# Patient Record
Sex: FEMALE | Race: WHITE | NOT HISPANIC OR LATINO | Employment: OTHER | URBAN - METROPOLITAN AREA
[De-identification: names, ages, dates, MRNs, and addresses within clinical notes are randomized per-mention and may not be internally consistent; named-entity substitution may affect disease eponyms.]

---

## 2017-03-09 ENCOUNTER — OFFICE VISIT (OUTPATIENT)
Dept: INTERNAL MEDICINE | Facility: CLINIC | Age: 32
End: 2017-03-09
Payer: COMMERCIAL

## 2017-03-09 VITALS
BODY MASS INDEX: 25.39 KG/M2 | TEMPERATURE: 99 F | HEIGHT: 63 IN | WEIGHT: 143.31 LBS | SYSTOLIC BLOOD PRESSURE: 130 MMHG | HEART RATE: 72 BPM | DIASTOLIC BLOOD PRESSURE: 68 MMHG

## 2017-03-09 DIAGNOSIS — B35.1 ONYCHOMYCOSIS: Primary | ICD-10-CM

## 2017-03-09 DIAGNOSIS — R21 RASH: ICD-10-CM

## 2017-03-09 DIAGNOSIS — R03.0 PREHYPERTENSION: ICD-10-CM

## 2017-03-09 PROCEDURE — 99213 OFFICE O/P EST LOW 20 MIN: CPT | Mod: S$GLB,,, | Performed by: INTERNAL MEDICINE

## 2017-03-09 PROCEDURE — 1160F RVW MEDS BY RX/DR IN RCRD: CPT | Mod: S$GLB,,, | Performed by: INTERNAL MEDICINE

## 2017-03-09 PROCEDURE — 99999 PR PBB SHADOW E&M-EST. PATIENT-LVL III: CPT | Mod: PBBFAC,,, | Performed by: INTERNAL MEDICINE

## 2017-03-09 NOTE — PROGRESS NOTES
Subjective:       Patient ID: Megan Aldana is a 32 y.o. female.    Chief Complaint: Nail Problem    HPI     Patient is a 32 year old here today to discuss several issues.  1. Toe nail fungus:  Has not tried anything topical this time  Did try oral medication in the past and it helped  2. Red rash on the back of her legs.  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 Elevated BP reading today  No excess salt intake  No family history of HTN  Healthy exercise, diet    Review of Systems   Musculoskeletal: Negative for arthralgias.   Skin:        See HPI       Objective:      Physical Exam   Constitutional: She is oriented to person, place, and time. She appears well-developed and well-nourished. No distress.   HENT:   Head: Normocephalic and atraumatic.   Neurological: She is alert and oriented to person, place, and time.   Skin: Skin is warm and dry. She is not diaphoretic.   Psychiatric: She has a normal mood and affect. Her behavior is normal.   Nursing note and vitals reviewed.        Feet:  Left Great Toe:  Hypertrophy noted of the toe nail  Other toe nails on both feet were clean, healthy in apperance    Skin:  Posterior LE  Erythematous pustules noted BL    Assessment:       1. Onychomycosis    2. Rash    3. Prehypertension        Plan:         Try Topical Lamsil BID x 4-6 weeks  If no improvement or any worsening; trial of oral Terbinafine with regular ALT at 0,6,12 week intervals    For small pustules on back of the leg, this is likely from factor of shaving and also wearing tight leggings and sweating; make sure to keep that area dry as much as possible, trial of body wipe after workouts before changing clothes, HibClens wash twice a week     Elevated BP today, preHTN  Recommend check BP at home, if still considered preHTN, monitor  stress/anxiety, salt intake, excessive caffeine intake

## 2017-03-16 ENCOUNTER — PATIENT MESSAGE (OUTPATIENT)
Dept: INTERNAL MEDICINE | Facility: CLINIC | Age: 32
End: 2017-03-16

## 2017-03-16 RX ORDER — BACITRACIN ZINC AND POLYMYXIN B SULFATE 500; 10000 [USP'U]/G; [USP'U]/G
OINTMENT OPHTHALMIC EVERY 4 HOURS
Qty: 3.5 G | Refills: 0 | Status: SHIPPED | OUTPATIENT
Start: 2017-03-16 | End: 2017-03-26

## 2017-11-21 ENCOUNTER — TELEPHONE (OUTPATIENT)
Dept: SPORTS MEDICINE | Facility: CLINIC | Age: 32
End: 2017-11-21

## 2017-11-21 ENCOUNTER — HOSPITAL ENCOUNTER (EMERGENCY)
Facility: HOSPITAL | Age: 32
Discharge: HOME | End: 2017-11-21
Attending: EMERGENCY MEDICINE
Payer: OTHER MISCELLANEOUS

## 2017-11-21 VITALS
DIASTOLIC BLOOD PRESSURE: 81 MMHG | WEIGHT: 143 LBS | SYSTOLIC BLOOD PRESSURE: 120 MMHG | BODY MASS INDEX: 26.31 KG/M2 | HEIGHT: 62 IN | HEART RATE: 60 BPM | TEMPERATURE: 98 F | RESPIRATION RATE: 18 BRPM | OXYGEN SATURATION: 100 %

## 2017-11-21 DIAGNOSIS — S43.004A CLOSED DISLOCATION OF RIGHT SHOULDER, INITIAL ENCOUNTER: Primary | ICD-10-CM

## 2017-11-21 DIAGNOSIS — M25.511 RIGHT SHOULDER PAIN: ICD-10-CM

## 2017-11-21 LAB — HCG INTACT+B SERPL-ACNC: <1.2 MIU/ML

## 2017-11-21 PROCEDURE — 96375 TX/PRO/DX INJ NEW DRUG ADDON: CPT | Mod: 59

## 2017-11-21 PROCEDURE — 23650 CLTX SHO DSLC W/MNPJ WO ANES: CPT | Mod: RT

## 2017-11-21 PROCEDURE — 99283 EMERGENCY DEPT VISIT LOW MDM: CPT | Mod: 57,,, | Performed by: EMERGENCY MEDICINE

## 2017-11-21 PROCEDURE — 25000003 PHARM REV CODE 250: Performed by: EMERGENCY MEDICINE

## 2017-11-21 PROCEDURE — 23650 CLTX SHO DSLC W/MNPJ WO ANES: CPT | Mod: 54,RT,, | Performed by: EMERGENCY MEDICINE

## 2017-11-21 PROCEDURE — 63600175 PHARM REV CODE 636 W HCPCS: Performed by: EMERGENCY MEDICINE

## 2017-11-21 PROCEDURE — 99285 EMERGENCY DEPT VISIT HI MDM: CPT | Mod: 25

## 2017-11-21 PROCEDURE — 84702 CHORIONIC GONADOTROPIN TEST: CPT

## 2017-11-21 PROCEDURE — 23655 CLTX SHO DSLC W/MNPJ W/ANES: CPT

## 2017-11-21 PROCEDURE — 96361 HYDRATE IV INFUSION ADD-ON: CPT | Mod: 59

## 2017-11-21 PROCEDURE — 96374 THER/PROPH/DIAG INJ IV PUSH: CPT | Mod: 59

## 2017-11-21 RX ORDER — LIDOCAINE HYDROCHLORIDE 20 MG/ML
10 INJECTION, SOLUTION EPIDURAL; INFILTRATION; INTRACAUDAL; PERINEURAL
Status: COMPLETED | OUTPATIENT
Start: 2017-11-21 | End: 2017-11-21

## 2017-11-21 RX ORDER — ONDANSETRON 2 MG/ML
4 INJECTION INTRAMUSCULAR; INTRAVENOUS
Status: COMPLETED | OUTPATIENT
Start: 2017-11-21 | End: 2017-11-21

## 2017-11-21 RX ORDER — MORPHINE SULFATE 2 MG/ML
2 INJECTION, SOLUTION INTRAMUSCULAR; INTRAVENOUS
Status: DISCONTINUED | OUTPATIENT
Start: 2017-11-21 | End: 2017-11-21

## 2017-11-21 RX ORDER — HYDROCODONE BITARTRATE AND ACETAMINOPHEN 5; 325 MG/1; MG/1
1 TABLET ORAL EVERY 4 HOURS PRN
Qty: 6 TABLET | Refills: 0 | Status: SHIPPED | OUTPATIENT
Start: 2017-11-21

## 2017-11-21 RX ORDER — SODIUM CHLORIDE 9 MG/ML
1000 INJECTION, SOLUTION INTRAVENOUS CONTINUOUS
Status: DISCONTINUED | OUTPATIENT
Start: 2017-11-21 | End: 2017-11-21 | Stop reason: HOSPADM

## 2017-11-21 RX ORDER — MORPHINE SULFATE 4 MG/ML
4 INJECTION, SOLUTION INTRAMUSCULAR; INTRAVENOUS
Status: COMPLETED | OUTPATIENT
Start: 2017-11-21 | End: 2017-11-21

## 2017-11-21 RX ORDER — KETOROLAC TROMETHAMINE 30 MG/ML
15 INJECTION, SOLUTION INTRAMUSCULAR; INTRAVENOUS
Status: COMPLETED | OUTPATIENT
Start: 2017-11-21 | End: 2017-11-21

## 2017-11-21 RX ADMIN — SODIUM CHLORIDE 1000 ML: 0.9 INJECTION, SOLUTION INTRAVENOUS at 09:11

## 2017-11-21 RX ADMIN — ONDANSETRON 4 MG: 2 INJECTION INTRAMUSCULAR; INTRAVENOUS at 07:11

## 2017-11-21 RX ADMIN — SODIUM CHLORIDE 1000 ML: 0.9 INJECTION, SOLUTION INTRAVENOUS at 07:11

## 2017-11-21 RX ADMIN — KETOROLAC TROMETHAMINE 15 MG: 30 INJECTION, SOLUTION INTRAMUSCULAR at 08:11

## 2017-11-21 RX ADMIN — LIDOCAINE HYDROCHLORIDE 200 MG: 20 INJECTION, SOLUTION EPIDURAL; INFILTRATION; INTRACAUDAL; PERINEURAL at 09:11

## 2017-11-21 RX ADMIN — MORPHINE SULFATE 4 MG: 4 INJECTION INTRAVENOUS at 08:11

## 2017-11-21 NOTE — ED NOTES
Pt informed of need for urine specimen for XR and pain medication - states unable to provide at this time - MD informed, will collect beta hcg per MD verbal order with read back.

## 2017-11-21 NOTE — ED PROVIDER NOTES
Encounter Date: 11/21/2017    SCRIBE #1 NOTE: I, Nika Murphy, am scribing for, and in the presence of,  Dr. Arzate . I have scribed the following portions of the note - the Resident attestation.       History     Chief Complaint   Patient presents with    Shoulder Injury     right shoulder injury while working out, possibly dislocated     32 year old female presents via EMS for the evaluation of right shoulder pain.  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  She has never had a shoulder dislocation in the past.  Pain is worse on the lateral side of the right shoulder and she reports increased pain with attempted shoulder movement.           Review of patient's allergies indicates:   Allergen Reactions    Pcn [penicillins] Nausea Only and Rash     History reviewed. No pertinent past medical history.  History reviewed. No pertinent surgical history.  Family History   Problem Relation Age of Onset    Hypertension Mother     Hyperlipidemia Mother     Hyperlipidemia Father     Heart disease Father      stent placement    Heart disease Maternal Grandfather      MI x 3    Breast cancer Paternal Grandmother      dx in 70s    Cancer Paternal Grandmother      breast cancer    No Known Problems Sister     No Known Problems Brother     Colon cancer Neg Hx     Diabetes Neg Hx     Stroke Neg Hx      Social History   Substance Use Topics    Smoking status: Never Smoker    Smokeless tobacco: Never Used    Alcohol use Yes      Comment: occasionally     Review of Systems   Constitutional: Negative for fever.   HENT: Negative for sore throat.    Respiratory: Negative for shortness of breath.    Cardiovascular: Negative for chest pain.   Gastrointestinal: Negative for nausea.   Genitourinary: Negative for dysuria.   Musculoskeletal: Negative for back pain.        R shoulder pain   Skin: Negative for rash.    Neurological: Negative for weakness.   Hematological: Does not bruise/bleed easily.       Physical Exam     Initial Vitals [11/21/17 0718]   BP Pulse Resp Temp SpO2   104/66 (!) 54 20 98 °F (36.7 °C) 99 %      MAP       78.67         Physical Exam    Nursing note and vitals reviewed.  Constitutional: She appears well-developed and well-nourished. She is not diaphoretic.   HENT:   Head: Normocephalic and atraumatic.   Right Ear: External ear normal.   Left Ear: External ear normal.   Eyes: Right eye exhibits no discharge. Left eye exhibits no discharge.   Neck: No thyromegaly present. No tracheal deviation present.   Cardiovascular: Normal rate, regular rhythm and normal heart sounds.   Pulmonary/Chest: Breath sounds normal. No respiratory distress. She has no wheezes. She has no rales.   Abdominal: Soft. She exhibits no distension. There is no tenderness.   Musculoskeletal: She exhibits tenderness.   R shoulder with apparent anterior dislocation.  and elbow strength intact distal to the lesion. Shoulder ROM not attempted due pain. + TTP of anterior and lateral aspect of shoulder. + TTP to R trapezius muscle. No clavicle tenderness   Neurological: She is alert and oriented to person, place, and time. She has normal strength.   Skin: Skin is warm and dry. No erythema. No pallor.   Psychiatric: She has a normal mood and affect. Thought content normal.         ED Course   Orthopedic Injury  Date/Time: 11/21/2017 9:44 AM  Performed by: RAYMOND ACEVEDO  Authorized by: ZOYA GUTIERREZ     Assisting Provider:  ZOYA GUTIERREZ  Pre-operative diagnosis:  Shoulder dislocation  Post-operative diagnosis:  Shoulder dislocation R  Consent Done?:  Not Needed  Injury:     Injury location:  Shoulder    Location details:  Right shoulder      Pre-procedure assessment:     Neurovascular status: Neurovascularly intact      Distal perfusion: normal      Neurological function: normal      Range of motion: normal       Local anesthesia used?: Yes      Anesthesia:  Local infiltration    Local anesthetic:  Lidocaine 2% without epinephrine    Anesthetic total (ml):  10    Patient sedated?: No        Procedure details:     Description of findings:  R shoulder put back in place with FARES technique  Selections made in this section will also lock the Injury type section above.:     Immobilization:  Sling    Complications: No      Estimated blood loss (mL):  0    Specimens: No      Implants: No    Post-procedure assessment:     Neurovascular status: Neurovascularly intact      Distal perfusion: normal      Neurological function: normal      Range of motion: normal      Patient tolerance:  Patient tolerated the procedure well with no immediate complications      Labs Reviewed   HCG, QUANTITATIVE, PREGNANCY             Medical Decision Making:   History:   Old Medical Records: I decided to obtain old medical records.  Initial Assessment:   32 female with R shoulder pain and deformity after jumping into a pushup position in a workout class this AM.  Differential Diagnosis:   Dislocation vs contusion, vs fracture  Clinical Tests:   Lab Tests: Ordered and Reviewed  Radiological Study: Ordered and Reviewed  ED Management:  Pt unable to urinate, will shield her for x-rays. Will treat pain with toradol and move up to opiate pain medication if blood pressure improves. If shoulder is out of place on plain films will attempt first to relocated with the FARES technique.       HO-III Update:   shoulder has been relocated with FARES technique.  No immediate complications and the patient is feeling improved at this time.  She is completely neurovascularly intact with no numbness to the deltoid area.   strength intact.  We will repeat plain films and the patient will follow-up with the orthopedic clinic in [place] MD, PGY- 3  9:48 AM    HO-III Update:  Repeat films unreamarkable, will discharge at this time with small amount of norco  and referral to Dr. Cardenas for orthopedic management.            Scribe Attestation:   Scribe #1: I performed the above scribed service and the documentation accurately describes the services I performed. I attest to the accuracy of the note.    Attending Attestation:   Physician Attestation Statement for Resident:  As the supervising MD   Physician Attestation Statement: I have personally seen and examined this patient.   I agree with the above history. -: 32 y.o. female presents by EMS for evaluation of acute right shoulder pain.    As the supervising MD I agree with the above PE.    As the supervising MD I agree with the above treatment, course, plan, and disposition.  I was personally present during the entire procedure.  I have reviewed and agree with the residents interpretation of the following: lab data and x-rays.                    ED Course      Clinical Impression:   The primary encounter diagnosis was Closed dislocation of right shoulder, initial encounter. A diagnosis of Right shoulder pain was also pertinent to this visit.    Disposition:   Disposition: Discharged  Condition: Stable  right shoulder dislocation                      Santi Tillman MD  Resident  11/21/17 1026       Javi Arzate MD  11/24/17 9622

## 2017-11-21 NOTE — ED TRIAGE NOTES
"Pt arrived to ED via EMS with CC of right shoulder injury - reports was doing a "jumping push up" and landed wrong - c/o pain to shoulder radiating down arm to just before elbow - reports numbness and tingling to RUE, also reports sensation present when touched with finger, radial pulse present and strong. Denies any other complaints at this time. Rates pain 8/10 at this time.    EMS reports pt received fentanly 50 mcg and 4mg zofran IV en route, reports SBP dropped to 80s after pain medication so started pt on 1L NS, pt received approx 100cc NS PTA, BP normotensive on arrival, NS discontinued.  "

## 2017-11-22 ENCOUNTER — OFFICE VISIT (OUTPATIENT)
Dept: URGENT CARE | Facility: CLINIC | Age: 32
End: 2017-11-22
Payer: OTHER MISCELLANEOUS

## 2017-11-22 DIAGNOSIS — M25.511 ACUTE PAIN OF RIGHT SHOULDER: ICD-10-CM

## 2017-11-22 DIAGNOSIS — S43.004A CLOSED DISLOCATION OF RIGHT SHOULDER, INITIAL ENCOUNTER: Primary | ICD-10-CM

## 2017-11-22 PROCEDURE — 99203 OFFICE O/P NEW LOW 30 MIN: CPT | Mod: S$GLB,,, | Performed by: NURSE PRACTITIONER

## 2017-11-22 RX ORDER — ETODOLAC 400 MG/1
400 TABLET, FILM COATED ORAL 2 TIMES DAILY
Qty: 30 TABLET | Refills: 1 | Status: SHIPPED | OUTPATIENT
Start: 2017-11-22 | End: 2018-11-22

## 2017-11-22 NOTE — LETTER
[facility] [place]  [address], [address]  [place] [address]  Phone: [phone]  Fax: [phone]    Pt Name: Megan Aldana  Injury Date: 11/21/2017   Employee ID: [id number] Date of First Treatment: 11/22/2017   Company: [employer]            Appointment Time: Arrived:  8:48 AM    Physician: Melanie Berrios NP Check out: 10:35 AM           Office Treatment: Megan was seen today for shoulder pain.    Diagnoses and all orders for this visit:  Closed dislocation of right shoulder, initial encounter  -     etodolac (LODINE) 400 MG tablet; Take 1 tablet (400 mg total) by mouth 2 (two) times daily. Take with food  Acute pain of right shoulder  -     etodolac (LODINE) 400 MG tablet; Take 1 tablet (400 mg total) by mouth 2 (two) times daily. Take with food     Patient Instructions: Attention not to aggravate affected area, Apply ice 24-48 hours then apply heat/warm soaks, Use splint as directed, Daily home exercises/warm soaks    WORK STATUS: LIGHT DUTY  Avoid frequent bending/lifting/twisting,   No lifting/pushing/pulling more than 10 lbs,   Limited use of right hand and arm,   No above the shoulder/overhead work,   Avoid climbing/kneeling/squatting   Splint to right upper extremity.         Return Appointment: 11/27/2017@12:45 PM

## 2017-11-22 NOTE — PROGRESS NOTES
"Subjective:       Patient ID: Megan Aldana is a 32 y.o. female.    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 Pt was transported to [facility]. (see encounter) She follows-up with [facility] for tx and work status.       Shoulder Pain    The pain is present in the right shoulder. The current episode started yesterday. The problem occurs constantly. The problem has been gradually improving. The quality of the pain is described as aching. The pain is at a severity of 5/10. The pain is moderate. Associated symptoms include stiffness. Pertinent negatives include no fever, headaches, itching or numbness. The symptoms are aggravated by activity. She has tried acetaminophen for the symptoms. There is no history of gout.     Review of Systems   Constitution: Negative for chills, fever and weakness.   HENT: Negative for congestion, ear pain and nosebleeds.    Eyes: Negative for blurred vision and pain.   Cardiovascular: Negative for chest pain and palpitations.   Respiratory: Negative for cough, shortness of breath and wheezing.    Skin: Negative for dry skin, itching and rash.   Musculoskeletal: Positive for joint pain and stiffness. Negative for arthritis, back pain, gout, joint swelling, muscle weakness and neck pain.   Gastrointestinal: Negative for abdominal pain, constipation, diarrhea, nausea and vomiting.   Genitourinary: Negative for dysuria, frequency and hematuria.   Neurological: Negative for dizziness, headaches, numbness and seizures.   Allergic/Immunologic: Negative for hives.   All other systems reviewed and are negative.      Objective:      Physical Exam   Constitutional: She is oriented to person, place, and time. She appears well-developed and well-nourished.   Cardiovascular: Normal rate.    Pulmonary/Chest: Effort normal and breath sounds " normal.   Musculoskeletal: She exhibits tenderness.        Right shoulder: She exhibits decreased range of motion, tenderness, swelling, pain and decreased strength.        Right elbow: Normal.       Right wrist: Normal.        Cervical back: Normal.        Right upper arm: Normal.        Right forearm: Normal.        Arms:       Right hand: Normal.   Neurological: She is oriented to person, place, and time. She displays normal reflexes. No cranial nerve deficit or sensory deficit. She exhibits normal muscle tone. Coordination normal.   Skin: Skin is warm and dry.   Psychiatric: She has a normal mood and affect. Her behavior is normal.       Assessment:       1. Closed dislocation of right shoulder, initial encounter    2. Acute pain of right shoulder        Plan:       Megan was seen today for shoulder pain.    Diagnoses and all orders for this visit:    Closed dislocation of right shoulder, initial encounter  -     etodolac (LODINE) 400 MG tablet; Take 1 tablet (400 mg total) by mouth 2 (two) times daily. Take with food    Acute pain of right shoulder  -     etodolac (LODINE) 400 MG tablet; Take 1 tablet (400 mg total) by mouth 2 (two) times daily. Take with food    X-ray Shoulder 2 Or More Views Right    Result Date: 11/21/2017  3 views: Scratch that 2 views: No fracture dislocation bone destruction seen. Electronically signed by: ROBIN COURTNEY MD Date:     11/21/17 Time:    10:01     X-ray Shoulder Trauma Right    Result Date: 11/21/2017  3 views: There is anterior subcoracoid dislocation possible glenoid fracture injury Bankart lesion. Electronically signed by: ROBIN COURTNEY MD Date:     11/21/17 Time:    08:28       Medications Ordered This Encounter      etodolac (LODINE) 400 MG tablet          Sig: Take 1 tablet (400 mg total) by mouth 2 (two) times daily. Take with food          Dispense:  30 tablet          Refill:  1  Patient Instructions: Attention not to aggravate affected area, Apply ice 24-48 hours  then apply heat/warm soaks, Use splint as directed, Daily home exercises/warm soaks   Restrictions: Avoid frequent bending/lifting/twisting, No lifting/pushing/pulling more than 10 lbs, Limited use of right hand and arm, No above the shoulder/overhead work, Avoid climbing/kneeling/squatting (Splint to right upper extremity.  )  Return in about 5 days (around 11/27/2017).

## 2017-11-22 NOTE — PATIENT INSTRUCTIONS
Joint Dislocation    You have a joint dislocation. This happens when a strong force is applied to the joint, tearing ligaments and forcing the bones out of place. Often no bones are broken. But the nearby nerves and blood vessels can be damaged.  Once the joint is put back in place, it will take at least 6 weeks for the ligaments to heal. Range of motion exercises or physical therapy may be prescribed early in your recovery. This is to prevent the joint from getting stiff. Later, strengthening exercises may be added.  In more severe cases, surgery may be needed to realign the joint and repair the torn ligaments or any broken bones. After a dislocation, the joint may not regain full range of motion or heal fully. Also, if the joint surface was fractured or broken, you are at risk of getting arthritis in that joint.  Home care  · If you were given a sling or splint, wear it until your next provider visit. Dont take it off at night to sleep. It is possible to re-injure the joint while you sleep. Unless told otherwise, you may take off the sling or splint to bathe or dress.  · If no bones were broken, its important to begin moving the joint during the first few weeks after the injury. This is to prevent the joint from getting stiff. On your next visit, ask your provider when you should begin these exercises.  · Apply an ice pack to the injured area for no more than 20 minutes. Do this every 3 to 6 hours for the first 24 to 48 hours. Keep using ice 4 times a day for the next few days until the pain is better. To make an ice pack, put ice cubes in a sealed zip-lock plastic bag. Wrap the bag in a clean, thin towel or cloth. Never put ice or an ice pack directly on the skin. You can place the ice pack inside the sling or over the splint. As the ice melts, be careful that the sling or splint doesnt get wet. You can place the ice pack inside the sling or over the splint.  · You may use over-the-counter pain medicine to  control pain, unless another pain medicine was prescribed. Talk with your provider before using these medicines if you have chronic liver or kidney disease, or ever had a stomach ulcer or GI (gastrointestinal) bleeding.  Follow-up care  Follow up with your healthcare provider in 1 week, or as advised.  If X-rays or a CT scan, were taken, you will be notified of any new findings that may affect your care.  When to seek medical advice  Call your healthcare provider right away if any of these occur:  · There is increasing swelling or pain in or around the injured joint  · Your affected arm or leg becomes cold, blue, numb, or tingly  Date Last Reviewed: 11/19/2015 © 2000-2017 [employer]. [address] [place] [address], [place] [address]. All rights reserved. This information is not intended as a substitute for professional medical care. Always follow your healthcare professional's instructions.        Exercises for Shoulder Flexibility: External Rotation    This stretch can help restore shoulder flexibility and relieve pain over time. When stretching, be sure to breathe deeply. Follow any special instructions from your doctor or physical therapist:  1.  a doorway. Grasp the doorjamb with the hand on the frozen side. Your arm should be bent.  2. With the other hand, hold the elbow on the frozen side firmly against your body.  3. Standing in the same spot, rotate your body away from the doorjamb. Stop when you feel the stretch in the shoulder. At first, try to hold the stretch for 5 seconds.  4. Work up to doing 3 sets of this stretch, 3 times a day. Work up to holding the stretch for 30 to 60 seconds.  Note: Keep your arms as still as you can. Over time, rotate your body a little more to enhance the stretch. But be careful not to twist your back.  Frozen shoulder  Frozen shoulder is another name for adhesive capsulitis, which causes restricted movement in the shoulder. If you have frozen shoulder, this  stretch may cause discomfort, especially when you first get started. A few months may pass before you achieve the results you want. But once your shoulder heals, it rarely becomes frozen again. So stick to your stretching program. If you have any questions, be sure to ask your doctor.   Date Last Reviewed: 8/16/2015  © 8131-4953 [employer]. [address] [place] [address], [place] [address]. All rights reserved. This information is not intended as a substitute for professional medical care. Always follow your healthcare professional's instructions.        Exercises for Shoulder Flexibility: Internal Rotation    This stretch can help restore shoulder flexibility and relieve pain over time. When stretching, be sure to breathe deeply. Follow any special instructions from your healthcare provider or physical therapist.  5. While seated, move the arm on the side you want to stretch toward the middle of your back. The palm of your hand should face out.  6. Cup your other hand under the hand thats behind your back. Gently push your cupped hand upward until you feel the stretch in the shoulder. Try to hold the stretch for 5 seconds.  7. Work up to doing 3 sets of this stretch, 3 times a day. Work up to holding the stretch for 30 to 60 seconds.  Note: Keep your back straight. Its OK if your hand cant reach the middle of your back. Instead, start the stretch with your hand as close as you can get it to the middle of your back.     Frozen shoulder  Frozen shoulder is another name for adhesive capsulitis. This causes restricted movement in the shoulder. If you have frozen shoulder, this stretch may cause discomfort, especially when you first get started. A few months may pass before you achieve the results you want. But once your shoulder heals, it rarely becomes frozen again. So stick to your stretching program. If you have any questions, be sure to ask your healthcare provider.   Date Last Reviewed: 10/14/2015  ©  8733-5525 [employer]. [address] [place] [address]. All rights reserved. This information is not intended as a substitute for professional medical care. Always follow your healthcare professional's instructions.        Exercises for Shoulder Flexibility: Adduction (Reaching Across)    This stretch can help restore shoulder flexibility and relieve pain over time. When stretching, be sure to breathe deeply. And follow any special instructions from your doctor or physical therapist:  8. Put the hand from the side you want to stretch on your opposite shoulder. Your elbow should point away from your body. Try to raise your elbow as close to shoulder height as you can.  9. With your other hand, push the raised elbow toward the opposite shoulder. Avoid turning your head. Stop when you feel the stretch. Try to hold the stretch for 5 seconds.  10. Work up to doing 3 sets of this stretch, 3 times a day. Work up to holding the stretch for 30 to 60 seconds.  Note: Be sure to push your elbow across your chest, not up toward your chin. Over time, try to push your elbow farther across your chest to enhance the stretch.  Frozen shoulder  Frozen shoulder is another name for adhesive capsulitis, which causes restricted movement in the shoulder. If you have frozen shoulder, this stretch may cause discomfort, especially when you first get started. A few months may pass before you achieve the results you want. Once your shoulder heals, it rarely becomes frozen again. So stick to your stretching program. If you have any questions, be sure to ask your doctor.   Date Last Reviewed: 8/16/2015  © 7052-5986 [employer]. [address] [place] [address]. All rights reserved. This information is not intended as a substitute for professional medical care. Always follow your healthcare professional's instructions.        Exercises for Shoulder Flexibility: Back Scratch    Improving your  flexibility can reduce pain. Stretching exercises also can help increase your range of pain-free motion. Breathe normally when you exercise. Try to use smooth, fluid movements. Never force a stretch.  Note: Follow any special instructions you are given. If you feel pain, stop the exercise. If the pain continues after stopping, call your healthcare provider.  · Stand straight, placing the back of your hand on the side you want to stretch flat against your lower back.  · Throw one end of a towel over your shoulder. Grab it behind your back with your other hand.  · Pull down gently on the towel with your front arm. Let your back arm slide up as high as is comfortable. Youll feel a stretch in your shoulder. Hold the stretch for a few seconds.  · Repeat 3 to 5 times. Build up to holding each stretch for 30 to 60 seconds.  For your safety, check with your healthcare provider before starting an exercise program.   Date Last Reviewed: 8/26/2015  © 2875-7142 [employer] [address] [place] [address], [place] [address]. All rights reserved. This information is not intended as a substitute for professional medical care. Always follow your healthcare professional's instructions.

## 2017-11-27 ENCOUNTER — OFFICE VISIT (OUTPATIENT)
Dept: URGENT CARE | Facility: CLINIC | Age: 32
End: 2017-11-27
Payer: OTHER MISCELLANEOUS

## 2017-11-27 DIAGNOSIS — M25.511 ACUTE PAIN OF RIGHT SHOULDER: ICD-10-CM

## 2017-11-27 DIAGNOSIS — S43.004D CLOSED DISLOCATION OF RIGHT SHOULDER, SUBSEQUENT ENCOUNTER: Primary | ICD-10-CM

## 2017-11-27 PROCEDURE — 99213 OFFICE O/P EST LOW 20 MIN: CPT | Mod: S$GLB,,, | Performed by: PREVENTIVE MEDICINE

## 2017-11-27 NOTE — PROGRESS NOTES
Subjective:       Patient ID: Megan Aldana is a 32 y.o. female.    Chief Complaint: Shoulder Pain    F/u right shoulder pain (doi 11/21/2017)       Shoulder Pain    The pain is present in the right shoulder. This is a new problem. The current episode started in the past 7 days. The problem occurs intermittently. The problem has been gradually improving. The quality of the pain is described as aching and sharp. The pain is at a severity of 3/10. Associated symptoms include a limited range of motion. Pertinent negatives include no fever, headaches, itching, numbness or stiffness. The symptoms are aggravated by activity. She has tried NSAIDS for the symptoms. The treatment provided moderate relief. There is no history of gout.     Review of Systems   Constitution: Negative for chills, fever and weakness.   HENT: Negative for congestion, ear pain and nosebleeds.    Eyes: Negative for blurred vision and pain.   Cardiovascular: Negative for chest pain and palpitations.   Respiratory: Negative for cough, shortness of breath and wheezing.    Skin: Negative for dry skin, itching and rash.   Musculoskeletal: Positive for joint pain. Negative for arthritis, back pain, gout, joint swelling, muscle weakness, neck pain and stiffness.   Gastrointestinal: Negative for abdominal pain, constipation, diarrhea, nausea and vomiting.   Genitourinary: Negative for dysuria, frequency and hematuria.   Neurological: Negative for dizziness, headaches, numbness and seizures.   Allergic/Immunologic: Negative for hives.   All other systems reviewed and are negative.      Objective:      Physical Exam   Constitutional: She appears well-developed and well-nourished.   HENT:   Head: Normocephalic and atraumatic.   Eyes: EOM are normal. Pupils are equal, round, and reactive to light.   Neck: Normal range of motion.   Cardiovascular: Normal rate.    Pulmonary/Chest: Effort normal.   Musculoskeletal:        Right shoulder: She exhibits  decreased range of motion, tenderness and pain. She exhibits no bony tenderness, no swelling, no effusion, no crepitus, no deformity, no laceration, no spasm, normal pulse and normal strength.        Lumbar back: She exhibits decreased range of motion, tenderness, pain and spasm. She exhibits no bony tenderness, no swelling, no edema, no deformity, no laceration and normal pulse.        Arms:  Pain with flexion to 90, abduction to 90 and extension to 45 degrees. Pain with internal and external rotation of the right shoulder. No other motor or sensory deficits.   Neurological: She is alert.   No focal neurologic deficits   Skin: Skin is warm and dry.   Psychiatric: She has a normal mood and affect.   Nursing note and vitals reviewed.      Assessment:       1. Closed dislocation of right shoulder, subsequent encounter    2. Acute pain of right shoulder        Plan:            Patient Instructions: Daily home exercises/warm soaks (Continue taking Lodine 400 mg BID with food as needed.)   Restrictions: Avoid climbing/kneeling/squatting, No lifting/pushing/pulling more than 10 lbs, No above the shoulder/overhead work  Return in about 1 week (around 12/4/2017).

## 2017-11-27 NOTE — LETTER
[facility]  [address], [address]  [place] [address]  Phone: [phone]  Fax: [phone]    Pt Name: Megan Aldana  Injury Date: 11/21/2017   Employee ID: [id number] Datet: 11/27/2017   Company: [employer]            Appointment Time:  9:03 AM Arrived: 12:45 PM CST   Physician: Brian Gonzalez MD Check out: 10:12 AM           Office Treatment: Megna was seen today for shoulder pain.    Diagnoses and all orders for this visit:    Closed dislocation of right shoulder, subsequent encounter    Acute pain of right shoulder       Patient Instructions: Daily home exercises/warm soaks (Continue taking Lodine 400 mg BID with food as needed.)      WORK STATUS: LIGHT DUTY  Avoid climbing/kneeling/squatting,   No lifting/pushing/pulling more than 10 lbs,   No above the shoulder/overhead work       Return Appointment: 12/4/2017@8:30 am

## 2017-11-28 ENCOUNTER — TELEPHONE (OUTPATIENT)
Dept: SPORTS MEDICINE | Facility: CLINIC | Age: 32
End: 2017-11-28

## 2017-11-28 NOTE — TELEPHONE ENCOUNTER
Spoke to patient and informed her that Dr. Gates would see her for her workers' comp injury. She states that she is seeing occupational med and would like to continue seeing them. If she needs a referral to sports medicine then she will call us to make an appointment.    Cintia Agustin MA  [facility]

## 2017-12-04 ENCOUNTER — OFFICE VISIT (OUTPATIENT)
Dept: URGENT CARE | Facility: CLINIC | Age: 32
End: 2017-12-04
Payer: OTHER MISCELLANEOUS

## 2017-12-04 DIAGNOSIS — S43.004D CLOSED DISLOCATION OF RIGHT SHOULDER, SUBSEQUENT ENCOUNTER: Primary | ICD-10-CM

## 2017-12-04 DIAGNOSIS — M25.511 ACUTE PAIN OF RIGHT SHOULDER: ICD-10-CM

## 2017-12-04 PROCEDURE — 99213 OFFICE O/P EST LOW 20 MIN: CPT | Mod: S$GLB,,, | Performed by: PREVENTIVE MEDICINE

## 2017-12-04 NOTE — PROGRESS NOTES
Subjective:       Patient ID: Megan Aldana is a 32 y.o. female.    Chief Complaint: Shoulder Pain (right)    F/u right shoulder pain (doi 11/21/2017) Pt reports intermittent right shoulder pain. 3-4/10 on pain scale. She states that she is slowly increasing weight bearing to her upper body.      Shoulder Pain    The pain is present in the right shoulder. The current episode started 1 to 4 weeks ago. The problem occurs intermittently. The problem has been gradually improving. The quality of the pain is described as aching. The pain is at a severity of 3/10. The pain is mild. Pertinent negatives include no fever, headaches, itching, numbness or stiffness. The symptoms are aggravated by activity. She has tried movement and acetaminophen for the symptoms. The treatment provided moderate relief. There is no history of gout.     Review of Systems   Constitution: Negative for chills, fever and weakness.   HENT: Negative for congestion, ear pain and nosebleeds.    Eyes: Negative for blurred vision and pain.   Cardiovascular: Negative for chest pain and palpitations.   Respiratory: Negative for cough, shortness of breath and wheezing.    Skin: Negative for dry skin, itching and rash.   Musculoskeletal: Positive for joint pain. Negative for arthritis, back pain, gout, joint swelling, muscle weakness, neck pain and stiffness.   Gastrointestinal: Negative for abdominal pain, constipation, diarrhea, nausea and vomiting.   Genitourinary: Negative for dysuria, frequency and hematuria.   Neurological: Negative for dizziness, headaches, numbness and seizures.   Allergic/Immunologic: Negative for hives.   All other systems reviewed and are negative.      Objective:      Physical Exam   Constitutional: She appears well-developed and well-nourished.   HENT:   Head: Normocephalic.   Eyes: Pupils are equal, round, and reactive to light.   Neck: Normal range of motion.   Cardiovascular: Normal rate.    Pulmonary/Chest:  Effort normal.   Musculoskeletal:        Right shoulder: She exhibits decreased range of motion, tenderness, bony tenderness and pain. She exhibits no swelling, no effusion, no crepitus, no deformity, no laceration, no spasm, normal pulse and normal strength.        Lumbar back: She exhibits no bony tenderness, no swelling, no edema, no deformity, no laceration, no spasm and normal pulse.        Arms:  Pain about right shoulder both posteriorly and superior aspect of right shoulder. No ecchymosis or erythema noted. Range of motion of right shoulder limited above 90 degrees on flexion and abduction.   Neurological: She is alert.   No focal neurologic deficits   Skin: Skin is warm.   Psychiatric: She has a normal mood and affect.   Nursing note and vitals reviewed.      Assessment:       1. Closed dislocation of right shoulder, subsequent encounter    2. Acute pain of right shoulder        Plan:            Patient Instructions: Daily home exercises/warm soaks (Continue Lodine 400mg BID )   Restrictions: Avoid climbing/kneeling/squatting, No lifting/pushing/pulling more than 10 lbs, No above the shoulder/overhead work  Return in about 1 week (around 12/11/2017).

## 2017-12-04 NOTE — LETTER
[facility]  [address], [address]  [place] [address]  Phone: [phone]  Fax: [phone]    Pt Name: Megan Aldana  Injury Date: 11/21/2017   Employee ID: [id number] Date: 12/04/2017   Company: [employer]            Appointment Time: 3:15 PM Arrived:  2:58 PM CST   Physician: LLUVIA Gonzalez MD Check out: 4:45 PM           Office Treatment: Megan was seen today for shoulder pain.    Diagnoses and all orders for this visit:    Closed dislocation of right shoulder, subsequent encounter  -     X-Ray Shoulder Trauma 3 view Right; Future    Acute pain of right shoulder       Patient Instructions: Daily home exercises/warm soaks (Continue Lodine 400mg BID )    Restrictions: LIGHT DUTY  Avoid climbing/kneeling/squatting,   No lifting/pushing/pulling more than 10 lbs,   No above the shoulder/overhead work.   Patient preferred evaluation with orthopedic surgery, and an MRI of right shoulder was recommended.       Return Appointment: 12/11/2017@ 3:00 PM